# Patient Record
Sex: FEMALE | Race: WHITE | NOT HISPANIC OR LATINO | URBAN - METROPOLITAN AREA
[De-identification: names, ages, dates, MRNs, and addresses within clinical notes are randomized per-mention and may not be internally consistent; named-entity substitution may affect disease eponyms.]

---

## 2019-11-11 ENCOUNTER — OFFICE VISIT (OUTPATIENT)
Dept: MEDICAL GROUP | Facility: IMAGING CENTER | Age: 24
End: 2019-11-11
Payer: COMMERCIAL

## 2019-11-11 VITALS
DIASTOLIC BLOOD PRESSURE: 70 MMHG | WEIGHT: 147 LBS | HEIGHT: 62 IN | RESPIRATION RATE: 12 BRPM | SYSTOLIC BLOOD PRESSURE: 106 MMHG | TEMPERATURE: 99 F | OXYGEN SATURATION: 99 % | HEART RATE: 77 BPM | BODY MASS INDEX: 27.05 KG/M2

## 2019-11-11 DIAGNOSIS — M25.562 PAIN AND SWELLING OF LEFT KNEE: Primary | ICD-10-CM

## 2019-11-11 DIAGNOSIS — Z76.89 ENCOUNTER TO ESTABLISH CARE WITH NEW DOCTOR: ICD-10-CM

## 2019-11-11 DIAGNOSIS — M25.462 PAIN AND SWELLING OF LEFT KNEE: Primary | ICD-10-CM

## 2019-11-11 PROCEDURE — 99203 OFFICE O/P NEW LOW 30 MIN: CPT | Performed by: NURSE PRACTITIONER

## 2019-11-11 RX ORDER — NORGESTIMATE AND ETHINYL ESTRADIOL 0.25-0.035
KIT ORAL
COMMUNITY
Start: 2019-11-03 | End: 2020-10-12 | Stop reason: SDUPTHER

## 2019-11-11 SDOH — HEALTH STABILITY: MENTAL HEALTH
STRESS IS WHEN SOMEONE FEELS TENSE, NERVOUS, ANXIOUS, OR CAN'T SLEEP AT NIGHT BECAUSE THEIR MIND IS TROUBLED. HOW STRESSED ARE YOU?: TO SOME EXTENT

## 2019-11-11 SDOH — HEALTH STABILITY: PHYSICAL HEALTH: ON AVERAGE, HOW MANY DAYS PER WEEK DO YOU ENGAGE IN MODERATE TO STRENUOUS EXERCISE (LIKE A BRISK WALK)?: 0 DAYS

## 2019-11-11 SDOH — HEALTH STABILITY: PHYSICAL HEALTH: ON AVERAGE, HOW MANY MINUTES DO YOU ENGAGE IN EXERCISE AT THIS LEVEL?: 0 MIN

## 2019-11-11 ASSESSMENT — PATIENT HEALTH QUESTIONNAIRE - PHQ9: CLINICAL INTERPRETATION OF PHQ2 SCORE: 0

## 2019-11-11 NOTE — LETTER
[facility]  Vannessa Ohvasquez, A.P.R.N.  [address]   [place] [id number]  Fax: [phone]   Authorization for Release/Disclosure of   Protected Health Information   Name: DAIJA HERRERA : 1995 SSN: [id number]   Address: [address] [place] [address] [address]  [place] [address] Phone:    [phone] (home)    I authorize the entity listed below to release/disclose the PHI below to:   [facility]/Vannessa Butler, A.P.R.N. and Vannessa Ohvasquez, A.P.R.N.   Provider or Entity Name:     Address   City, State, Zip   Phone:      Fax:     Reason for request: continuity of care   Information to be released:    [  ] LAST COLONOSCOPY,  including any PATH REPORT and follow-up  [  ] LAST FIT/COLOGUARD RESULT [  ] LAST DEXA  [  ] LAST MAMMOGRAM  [  ] LAST PAP  [  ] LAST LABS [  ] RETINA EXAM REPORT  [  ] IMMUNIZATION RECORDS  [  ] Release all info      [  ] Check here and initial the line next to each item to release ALL health information INCLUDING  _____ Care and treatment for drug and / or alcohol abuse  _____ HIV testing, infection status, or AIDS  _____ Genetic Testing    DATES OF SERVICE OR TIME PERIOD TO BE DISCLOSED: _____________  I understand and acknowledge that:  * This Authorization may be revoked at any time by you in writing, except if your health information has already been used or disclosed.  * Your health information that will be used or disclosed as a result of you signing this authorization could be re-disclosed by the recipient. If this occurs, your re-disclosed health information may no longer be protected by State or Federal laws.  * You may refuse to sign this Authorization. Your refusal will not affect your ability to obtain treatment.  * This Authorization becomes effective upon signing and will  on (date) __________.      If no date is indicated, this Authorization will  one (1) year from the signature date.    Name: Daija Herrera    Signature:   Date:     2019            PLEASE FAX REQUESTED RECORDS BACK TO: [phone]

## 2019-11-12 PROBLEM — M25.562 PAIN AND SWELLING OF LEFT KNEE: Status: ACTIVE | Noted: 2019-11-12

## 2019-11-12 PROBLEM — M25.462 PAIN AND SWELLING OF LEFT KNEE: Status: ACTIVE | Noted: 2019-11-12

## 2019-11-12 NOTE — ASSESSMENT & PLAN NOTE
Reports that in June 2018 she had left knee swelling with acute pain that lasted for about a month. Denies trauma or injury to knee. States that she was evaluated in [place] by rheumatology. Reports she was told it was not juvenile RA. At that time she was given an injection, unable to recall what the injection was.  States that she recently in August til early November this year had the same swelling and pain in her left knee. States the pain is localized to the medial upper portion of her knee. Denies fever or recent illness. It has since resolved. States when she is experiencing the pain and swelling it is difficult to bend her knee fully, place full pressure on knee when walking, and walk up and down stairs. States she uses a compression wrap with minimal improvement of pain. States she also took Ibuprofen intermittently. 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

## 2019-11-12 NOTE — PROGRESS NOTES
Subjective:     CC: Establish Care      HISTORY OF THE PRESENT ILLNESS: Patient is a 23 y.o. female. Her prior PCP was [facility] medical clinic as a student, last seen 8/2019.  Patient has history of intermittent left knee swelling with pain. Patient is here today to establish care with no acute concerns. 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 Reports taking Femynor 0.25-.35mg-mcg per tablet as her form of birthcontrol. Denies any side effects from medication. States her menstrual cycle is every 28 days and is light lasting 2-4 days.    Pain and swelling of left knee  Reports that in June 2018 she had left knee swelling with acute pain that lasted for about a month. Denies trauma or injury to knee. States that she was evaluated in [place] by rheumatology. Reports she was told it was not juvenile RA. At that time she was given an injection, unable to recall what the injection was.  States that she recently in August til early November this year had the same swelling and pain in her left knee. States the pain is localized to the medial upper portion of her knee. Denies fever or recent illness. It has since resolved. States when she is experiencing the pain and swelling it is difficult to bend her knee fully, place full pressure on knee when walking, and walk up and down stairs. States she uses a compression wrap with minimal improvement of pain. States she also took Ibuprofen intermittently. 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    Allergies: Patient has no known allergies.    Current Outpatient Medications Ordered in Epic   Medication Sig Dispense Refill   • FEMYNOR 0.25-35 MG-MCG per tablet        No current Epic-ordered facility-administered medications on file.      History reviewed. No pertinent past medical history.    History reviewed. No pertinent surgical history.    Social History     Tobacco Use   •  "Smoking status: Never Smoker   • Smokeless tobacco: Never Used   Substance Use Topics   • Alcohol use: Not Currently   • Drug use: Never     Social History     Patient does not qualify to have social determinant information on file (likely too young).   Social History Narrative   • Not on file     Family History   Problem Relation Age of Onset   • Arthritis Mother    • Cancer Mother    • No Known Problems Father    • Anemia Sister    • No Known Problems Brother    • Alzheimer's Disease Maternal Grandmother    • Cancer Paternal Grandmother      Health Maintenance: Completed. Discussed receiving her pap smear screening since turning 22-years-old.    ROS:   Constitutional: Denies fever, chills, night sweats, weight loss/gain or malaise/fatigue.   HENT: Denies headache, nasal congestion, sore throat, hearing loss, enlarged thyroid, or difficulty swallowing.    Eyes: Denies changes in vision, pain.   Respiratory: Denies cough, SOB at rest or activity.    Cardiovascular: Denies tachycardia, chest pain, palpitations, or  leg swelling.   Gastrointestinal: Denies N/V/C/D, abdominal pain, loss appetite, reflux, or hematochezia.  Genitourinary: Denies difficulty voiding, dysuria, nocturia, or hematuria.   Skin: Negative for rash or worrisome moles.   Neurological: Negative for dizziness, focal weakness and headaches.   Endo/Heme/Allergies: Denies bruise/bleed easily, allergies.   Psychiatric/Behavioral: Denies depression, nervous/anxious, difficulty sleeping.  MSK: Hx of left knee pain.  Objective:   Exam: /70 (BP Location: Left arm, Patient Position: Sitting, BP Cuff Size: Adult)   Pulse 77   Temp 37.2 °C (99 °F) (Temporal)   Resp 12   Ht 1.575 m (5' 2\")   Wt 66.7 kg (147 lb)   SpO2 99%  Body mass index is 26.89 kg/m².    General: Normal appearing. No distress.  HEENT: Normocephalic. Eyes conjunctiva clear lids without ptosis, PERRLA, ears normal shape and contour, canals are clear bilaterally, tympanic membranes " pearly gray, intact, non-bulging, no drainage noted, no turbinate erythema, no polyps visible, oropharynx is without erythema, edema or exudates. Teeth intact.  Neck: Supple without JVD or abnormal masses. Small soft mobile thyroid palpated, no nodules palpated, non-tender.  Pulmonary: Clear to ausculation.  Normal effort. No rales, ronchi, or wheezing.  Cardiovascular: Regular rate and rhythm without murmur. Pedal and radial pulses are intact and equal bilaterally.  Abdomen: Soft, nontender, nondistended. Normal bowel sounds. Liver and spleen are not palpable  Lymph: No cervical or supraclavicular lymph nodes are palpable  Skin: Warm and dry.  No obvious lesions.  Musculoskeletal: Normal gait. No extremity cyanosis, clubbing, or edema. Full AROM.  Unable to squat without pain in left knee. No crepitus noted in knees bilaterally. Mild tenderness felt in left medial upper portion of knee with light palpation. Mild non-pitting swelling in left knee when comparing to right knee. No signs of infection.  Psych: Normal mood and affect. Alert and oriented x3. Judgment and insight is normal.    Assessment & Plan:   1. Encounter to establish care with new doctor  Reviewed with patient medication use and side effects. Medical, past, surgical history reviewed with patient. Discussed CDC recommendations for immunizations and USPSTF guidelines for screening exams.  Patient encouraged to schedule pap smear per meeting guidelines for screening, verbalized understanding. Discussed at that time screening for STI per sexually active and never receiving screening for STI. Encouraged patient to wash hands regularly and avoid sick contacts while supporting immune system with Vitamin C, Zinc, Elderberry, and garlic. Discussed continuing Femynor 0.25-0.35mg-mcg pe tablet daily to prevent pregnancy.     2. ChronicPain and swelling of left knee  Medical release form signed to receive records from [facility] and [facility] who  treated patient in the past for recurrent knee pain. Encouraged to continue to wrap knee with compression wrap, use ice for 20 minutes up to 4 times a day, and take Ibuprofen 600 mg up to TID as needed for knee pain for up to three days with food. Discussed long term use of NSAIDS causing GI upset and bleeding verbalized understanding. Instructed to RTC if knee pain and swelling returns for further evaluation. Discussed possibly at that time referral to PT, declined at this time.    Return in about 4 weeks (around 12/9/2019) for PAP.    Please note that this dictation was created using voice recognition software. I have made every reasonable attempt to correct obvious errors, but I expect that there are errors of grammar and possibly content that I did not discover before finalizing the note.

## 2020-01-03 ENCOUNTER — APPOINTMENT (OUTPATIENT)
Dept: MEDICAL GROUP | Facility: IMAGING CENTER | Age: 25
End: 2020-01-03
Payer: COMMERCIAL

## 2020-01-17 ENCOUNTER — HOSPITAL ENCOUNTER (OUTPATIENT)
Dept: RADIOLOGY | Facility: MEDICAL CENTER | Age: 25
End: 2020-01-17
Attending: NURSE PRACTITIONER
Payer: COMMERCIAL

## 2020-01-17 ENCOUNTER — OFFICE VISIT (OUTPATIENT)
Dept: MEDICAL GROUP | Facility: IMAGING CENTER | Age: 25
End: 2020-01-17
Payer: COMMERCIAL

## 2020-01-17 VITALS
OXYGEN SATURATION: 97 % | BODY MASS INDEX: 26.5 KG/M2 | HEART RATE: 115 BPM | DIASTOLIC BLOOD PRESSURE: 82 MMHG | HEIGHT: 62 IN | TEMPERATURE: 103.9 F | SYSTOLIC BLOOD PRESSURE: 116 MMHG | WEIGHT: 144 LBS | RESPIRATION RATE: 16 BRPM

## 2020-01-17 DIAGNOSIS — R05.9 COUGH: ICD-10-CM

## 2020-01-17 DIAGNOSIS — R06.89 DECREASED BREATH SOUNDS: ICD-10-CM

## 2020-01-17 DIAGNOSIS — J02.9 SORE THROAT: ICD-10-CM

## 2020-01-17 DIAGNOSIS — J06.9 ACUTE URI: Primary | ICD-10-CM

## 2020-01-17 DIAGNOSIS — R06.02 SOB (SHORTNESS OF BREATH): ICD-10-CM

## 2020-01-17 PROCEDURE — 99213 OFFICE O/P EST LOW 20 MIN: CPT | Performed by: NURSE PRACTITIONER

## 2020-01-17 PROCEDURE — 71046 X-RAY EXAM CHEST 2 VIEWS: CPT

## 2020-01-17 RX ORDER — AZITHROMYCIN 250 MG/1
TABLET, FILM COATED ORAL
Qty: 6 TAB | Refills: 0 | Status: SHIPPED | OUTPATIENT
Start: 2020-01-17 | End: 2020-02-13

## 2020-01-17 RX ORDER — IPRATROPIUM BROMIDE AND ALBUTEROL SULFATE 2.5; .5 MG/3ML; MG/3ML
3 SOLUTION RESPIRATORY (INHALATION) ONCE
Status: COMPLETED | OUTPATIENT
Start: 2020-01-17 | End: 2020-01-17

## 2020-01-17 RX ORDER — BENZONATATE 100 MG/1
100 CAPSULE ORAL 3 TIMES DAILY PRN
Qty: 60 CAP | Refills: 0 | Status: SHIPPED | OUTPATIENT
Start: 2020-01-17

## 2020-01-17 RX ORDER — ALBUTEROL SULFATE 90 UG/1
2 AEROSOL, METERED RESPIRATORY (INHALATION) EVERY 4 HOURS PRN
Qty: 1 INHALER | Refills: 1 | Status: SHIPPED | OUTPATIENT
Start: 2020-01-17 | End: 2020-02-13

## 2020-01-17 RX ADMIN — IPRATROPIUM BROMIDE AND ALBUTEROL SULFATE 3 ML: 2.5; .5 SOLUTION RESPIRATORY (INHALATION) at 16:37

## 2020-01-17 ASSESSMENT — PAIN SCALES - GENERAL: PAINLEVEL: NO PAIN

## 2020-01-17 NOTE — PROGRESS NOTES
Subjective:     CC: No chief complaint on file.    HPI:   Nathan Don is a 24 y.o. female who presents for annual exam. She is feeling well and denies any complaints.    Ob-Gyn/ History:    Patient has GYN provider: {YES/NO:63}  /Para:  ***  Last Pap Smear:  ***. *** history of abnormal pap smears.  Gyn Surgery:  ***.  Current Contraceptive Method:  ***. *** currently sexually active.  Last menstrual period:  ***.  Periods regular. {Description; bleeding vaginal:00704} bleeding. Cramping is {MILD/MOD/SEVERE/VARIABLE:34494}.   She {DOES/DOES NOT:76365} take OTC analgesics for cramps.  No significant bloating/fluid retention, pelvic pain, or dyspareunia. No vaginal discharge  Post-menopausal bleeding: ***  Urinary incontinence: ***  Folate intake: *** {F childbearing age folate 0.4-0.8mg daily. USPSTF: A}    Health Maintenance  Advanced directive: *** {age >=65}  Osteoporosis Screen/ DEXA: *** {once 65 + or risk factors, frax tool}  PT/vit D for falls prevention: *** {65+ if higher risk for falls}  Cholesterol Screening: *** {45+ yo, q5yr}  Diabetes Screening: *** {40-71 yo who are overweight or obese}  Aspirin Use: ***  {50-59 for the primary prevention of cardiovascular disease (CVD) and colorectal cancer (CRC) if: 10% or greater 10-year CVD risk, not at increased risk for bleeding, life expectancy >10 years, and willing to take low-dose aspirin daily for 10 years. USPSTF: B}  Diet: *** {BMI ?25 diet & physical activity counseling, BMI ?30 offer or referral for intervention. USPSTF: B}  Exercise: *** {BMI ?25 diet & physical activity counseling, BMI ?30 offer or referral for intervention. USPSTF: B}  Substance Abuse: *** {counseling if alcohol misuse. USPSTF: B}  Safe in relationship. {F childbearing age intimate partner violence screening. USPSTF: B}  Seat belts, bike helmet, gun safety discussed.  Sun protection used.    Cancer screening  Cervical Cancer Screening:  Breast Cancer Screening:      Infectious disease screening/Immunizations  --STI Screening: *** {GC/CT F16-24 yearly, USPSTF: B/A}  --Practices safe sex.  --HIV Screening: *** {15-65 once. USPSTF: A}  --Hepatitis C Screening: *** {born [birth year]32 once. USPSTF: B}  --Immunizations:    Influenza: 11/12/19   HPV: 1st dose:  04/10/2009   Tetanus: 09/2015    Other immunizations:     She  has no past medical history on file.  She  has no past surgical history on file.    Family History   Problem Relation Age of Onset   • Arthritis Mother    • Cancer Mother    • No Known Problems Father    • Anemia Sister    • No Known Problems Brother    • Alzheimer's Disease Maternal Grandmother    • Cancer Paternal Grandmother        Social History     Socioeconomic History   • Marital status: Single     Spouse name: Not on file   • Number of children: Not on file   • Years of education: Not on file   • Highest education level: Not on file   Occupational History   • Not on file   Social Needs   • Financial resource strain: Not on file   • Food insecurity:     Worry: Not on file     Inability: Not on file   • Transportation needs:     Medical: Not on file     Non-medical: Not on file   Tobacco Use   • Smoking status: Never Smoker   • Smokeless tobacco: Never Used   Substance and Sexual Activity   • Alcohol use: Not Currently   • Drug use: Never   • Sexual activity: Yes     Partners: Male     Birth control/protection: Pill   Lifestyle   • Physical activity:     Days per week: 0 days     Minutes per session: 0 min   • Stress: To some extent   Relationships   • Social connections:     Talks on phone: Not on file     Gets together: Not on file     Attends Jewish service: Not on file     Active member of club or organization: Not on file     Attends meetings of clubs or organizations: Not on file     Relationship status: Not on file   • Intimate partner violence:     Fear of current or ex partner: Not on file     Emotionally abused: Not on file     Physically abused:  Not on file     Forced sexual activity: Not on file   Other Topics Concern   • Not on file   Social History Narrative   • Not on file       Patient Active Problem List    Diagnosis Date Noted   • Pain and swelling of left knee 11/12/2019       Current Outpatient Medications   Medication Sig Dispense Refill   • FEMYNOR 0.25-35 MG-MCG per tablet        No current facility-administered medications for this visit.      No Known Allergies    Review of Systems   Constitutional: Denies fever, chills, night sweats, weight loss/gain or malaise/fatigue.   HENT: Denies headache, nasal congestion, sore throat, hearing loss, enlarged thyroid, or difficulty swallowing.    Eyes: Denies changes in vision, pain.   Respiratory: Denies cough, SOB at rest or activity.    Cardiovascular: Denies tachycardia, chest pain, palpitations, or  leg swelling.   Gastrointestinal: Denies N/V/C/D, abdominal pain, loss appetite, reflux, or hematochezia.  Genitourinary: Denies difficulty voiding, dysuria, nocturia, or hematuria.   Skin: Negative for rash or worrisome moles.   Neurological: Negative for dizziness, focal weakness and headaches.   Endo/Heme/Allergies: Denies bruise/bleed easily, allergies.   Psychiatric/Behavioral: Denies depression, nervous/anxious, difficulty sleeping.  MSK: Hx of left knee pain.  Objective:     There were no vitals taken for this visit.  There is no height or weight on file to calculate BMI.  Wt Readings from Last 4 Encounters:   11/11/19 66.7 kg (147 lb)       Physical Exam:  General: Normal appearing. No distress.  HEENT: Normocephalic. Eyes conjunctiva clear lids without ptosis, PERRLA, ears normal shape and contour, canals are clear bilaterally, tympanic membranes pearly gray, intact, non-bulging, no drainage noted, Nares patent, no turbinate erythema, no polyps visible. Teeth intact. Oropharynx is without erythema, edema or exudates. Mallampati score***.   Neck: Supple without JVD or abnormal masses. Small soft  mobile thyroid palpated, no nodules palpated, non-tender.  Pulmonary: Clear to ausculation.  Normal effort. No rales, ronchi, or wheezing.  Cardiovascular: Regular rate and rhythm without murmur. Pedal and radial pulses are intact and equal bilaterally.  Abdomen: Soft, nontender, nondistended. Normal bowel sounds. Liver and spleen are not palpable.  Lymph: No cervical or supraclavicular lymph nodes are palpable  Skin: Warm and dry.  No obvious lesions. No rash noted.  Musculoskeletal: Normal gait. No extremity cyanosis, clubbing, or edema. Full AROM of neck and extremities.   Psych: Normal mood and affect. Judgment and insight is normal.  Breast: Breasts examined supine. No skin changes, peau d'orange or nipple retraction. No discharge. No axillary or supraclavicular adenopathy. No masses or nodularity palpable. ***  :Perineum and external genitalia normal without rash. Vagina with {GYN VAGINAL DISCHARGE:721} discharge. Cervix without visible lesions or discharge. Bimanual exam without adnexal masses or cervical motion tenderness.***  Neurological: Alert and oriented x 3. DTRs 2+/3 and symmetric. CN 2-12 grossly intact, Sensation intact.     A chaperone was offered to the patient during today's exam. Chaperone name: Katie Guillory was present.    Assessment and Plan:         Follow-up: No follow-ups on file.

## 2020-01-18 NOTE — PROGRESS NOTES
Subjective:   CC: Gynecologic Exam and Other (monday night. cough. runny nose. headache. body aches. acetimophen OTC. )    HPI:   Nathan presents today for annual exam with pap smear. Due to current acute illness, patient will reschedule annual and pap smear.    States that she started feeling ill on Monday January 13th, 2020. States illness started with fatigue, body aches, chills, sore throat, and nasal congestion. States sore throat became worse, and dull headache and cough developed on Wednesday. States that her headache is gone today. Denies fever, although, states she does not have a thermometer at home. Denies cough being productive. States current nasal secretions are clear to cloudy. States after coughing she feels SOB. 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 States that she has multiple sick contacts due to working in a school. States that a fellow teacher has told her that 3 children in her class tested positive for influenza. Denies any OTC remedies at this time.    History reviewed. No pertinent past medical history.    Social History     Tobacco Use   • Smoking status: Never Smoker   • Smokeless tobacco: Never Used   Substance Use Topics   • Alcohol use: Not Currently   • Drug use: Never     Current Outpatient Medications Ordered in Epic   Medication Sig Dispense Refill   • benzonatate (TESSALON) 100 MG Cap Take 1 Cap by mouth 3 times a day as needed for Cough. 60 Cap 0   • azithromycin (ZITHROMAX) 250 MG Tab 500 mg in a single dose on day 1 then 250 mg per day on days 2 through 5. 6 Tab 0   • albuterol 108 (90 Base) MCG/ACT Aero Soln inhalation aerosol Inhale 2 Puffs by mouth every four hours as needed for Shortness of Breath. 1 Inhaler 1   • FEMYNOR 0.25-35 MG-MCG per tablet        No current Epic-ordered facility-administered medications on file.      Allergies:  Patient has no known allergies.    ROS:  Constitutional: Denies fever, night sweats, weight loss/gain.  "Chills, malaise, and fatigue.   HENT: Denies hearing loss, enlarged thyroid, or difficulty swallowing.  Nasal congestion and sore throat.  Eyes: Denies changes in vision, pain.   Respiratory: Cough, SOB after coughing fit. Denies SOB at rest.    Cardiovascular: Denies tachycardia, chest pain, palpitations, or  leg swelling.   Gastrointestinal: Denies N/V/C/D, abdominal pain, loss appetite, reflux, or hematochezia.  Genitourinary: Denies difficulty voiding, dysuria, nocturia, or hematuria.   Skin: Negative for rash or worrisome moles.   Neurological: Negative for dizziness, focal weakness and headaches.   Endo/Heme/Allergies: Denies bruise/bleed easily, allergies.   Psychiatric/Behavioral: Denies depression, nervous/anxious, difficulty sleeping.    Objective:   Exam:  /82 (BP Location: Left arm, Patient Position: Sitting, BP Cuff Size: Adult)   Pulse (!) 115   Temp (!) 39.9 °C (103.9 °F) (Temporal)   Resp 16   Ht 1.575 m (5' 2\")   Wt 65.3 kg (144 lb)   LMP 01/02/2020   SpO2 97%   BMI 26.34 kg/m²  Body mass index is 26.34 kg/m².  General: Ill appearing. No distress.  HEENT: Normocephalic. Eyes conjunctiva clear lids without ptosis, eyes are watery, PERRLA, ears normal shape and contour, canals are clear bilaterally, tympanic membranes pearly gray, intact, mildly bulging, no drainage noted, no turbinate erythema, no polyps visible, oropharynx is with moderate erythema, no edema or exudates. Mallampati score +2. Teeth intact.  Neck: Supple without JVD or abnormal masses. Small soft mobile thyroid palpated, no nodules palpated, non-tender.  Pulmonary: Pretreatment: Diminished breath sound throughout. Normal effort. No rales, ronchi, or wheezing.  Posttreatment: Improved breath sounds throughout, clear to ausculation. Normal effort. No rales, ronchi, or wheezing.   Cardiovascular: Regular rate and rhythm without murmur. Pedal and radial pulses are intact and equal bilaterally.  Abdomen: Soft, nontender, " nondistended. Normal bowel sounds. Liver and spleen are not palpable  Lymph: No cervical or supraclavicular lymph nodes are palpable. Small, soft, mobile non-tender, enlarged submandibular lymph nodes palpated.  Skin: Warm and dry.  No obvious lesions.  Musculoskeletal: Normal gait. No extremity cyanosis, clubbing, or edema.  Psych: Normal mood and affect. Alert and oriented x3. Judgment and insight is normal.    Assessment & Plan:   1. Acute URI  2. Cough  3. SOB (shortness of breath)  4. Decreased breath sounds  This is a stable condition. Discussed with patient physical assessment finding. Discussed in office breathing treatment due to diminished breath sounds, verbalized understanding. Discussed continue albuertol inhaler at home. Verbalized that she has used on at home with ill in the past with similar symptoms. Instructed to use every 4-6 hours for the next 48 hours for SOB and/or cough, and then to slowly decrease use if possible. Discussed obtain chest xray due to diminished breath sounds on initial exam, cough, and fever, verbalized understanding. Discussed with patient illness is bacterial in nature due to length of illness and current temperature, and antibiotics are indicated at this time. Discussed negative POCT strep and Influenza A/B test, verbalized understanding. Discussed taking Zpack in its entirety, verbalized understanding. Discussed not returning to work until her fever is decrease and she is on antibiotics for 24 hours due to be contagious, verbalized understanding. Decliined work note at this time. Instructed to take 500-1000 mg of Tylenol when she gets home, and to continue every 6 hours as tolerated for fever and discomfort, not to exceed more than 4,000 mg in 24 hours. Discussed taking Tessalon pearls for on-going cough. Instructed how to take medication, verbalized understanding.  Encouraged to increase or maintain hydration. RTC if symptoms worsen or continues to have fever 102-104  degrees. Instructed to seek emergency services or urgent care if symptoms continue over the weekend, or if she is unable to maintain hydration, verbalized understanding. Encouraged patient to wash hands regularly and avoid sick contacts while supporting immune system with Vitamin C, Zinc, Elderberry, and garlic. Encouraged to obtain thermometer at pharmacy when picking up medication, verbalized understanding.     -     DX-CHEST-2 VIEWS; Future  -     benzonatate (TESSALON) 100 MG Cap; Take 1 Cap by mouth 3 times a day as needed for Cough.  -     azithromycin (ZITHROMAX) 250 MG Tab; 500 mg in a single dose on day 1 then 250 mg per day on days 2 through 5.  -     ipratropium-albuterol (DUONEB) nebulizer solution  -     POCT Influenza A/B  -     albuterol 108 (90 Base) MCG/ACT Aero Soln inhalation aerosol; Inhale 2 Puffs by mouth every four hours as needed for Shortness of Breath.    5. Sore throat  This is a stable condition. See plan of care above.   -     POCT Rapid Strep A      Return in about 1 week (around 1/24/2020), or follow-up and annual exam.    Please note that this dictation was created using voice recognition software. I have made every reasonable attempt to correct obvious errors, but I expect that there are errors of grammar and possibly content that I did not discover before finalizing the note.

## 2020-02-13 ENCOUNTER — OFFICE VISIT (OUTPATIENT)
Dept: MEDICAL GROUP | Facility: IMAGING CENTER | Age: 25
End: 2020-02-13
Payer: COMMERCIAL

## 2020-02-13 VITALS
OXYGEN SATURATION: 97 % | HEIGHT: 62 IN | BODY MASS INDEX: 28.34 KG/M2 | DIASTOLIC BLOOD PRESSURE: 68 MMHG | TEMPERATURE: 98.9 F | SYSTOLIC BLOOD PRESSURE: 116 MMHG | HEART RATE: 73 BPM | WEIGHT: 154 LBS | RESPIRATION RATE: 13 BRPM

## 2020-02-13 DIAGNOSIS — Z11.3 ROUTINE SCREENING FOR STI (SEXUALLY TRANSMITTED INFECTION): ICD-10-CM

## 2020-02-13 DIAGNOSIS — Z01.419 WELL WOMAN EXAM: Primary | ICD-10-CM

## 2020-02-13 DIAGNOSIS — Z12.4 ENCOUNTER FOR PAPANICOLAOU SMEAR OF CERVIX: ICD-10-CM

## 2020-02-13 DIAGNOSIS — R05.9 COUGH: ICD-10-CM

## 2020-02-13 DIAGNOSIS — J34.89 RHINORRHEA: ICD-10-CM

## 2020-02-13 PROCEDURE — 99395 PREV VISIT EST AGE 18-39: CPT | Performed by: NURSE PRACTITIONER

## 2020-02-13 SDOH — SOCIAL STABILITY: SOCIAL INSECURITY: WITHIN THE LAST YEAR, HAVE YOU BEEN HUMILIATED OR EMOTIONALLY ABUSED IN OTHER WAYS BY YOUR PARTNER OR EX-PARTNER?: NO

## 2020-02-13 SDOH — HEALTH STABILITY: MENTAL HEALTH: HOW MANY STANDARD DRINKS CONTAINING ALCOHOL DO YOU HAVE ON A TYPICAL DAY?: 1 OR 2

## 2020-02-13 SDOH — HEALTH STABILITY: MENTAL HEALTH: HOW OFTEN DO YOU HAVE A DRINK CONTAINING ALCOHOL?: MONTHLY OR LESS

## 2020-02-13 SDOH — SOCIAL STABILITY: SOCIAL INSECURITY
WITHIN THE LAST YEAR, HAVE TO BEEN RAPED OR FORCED TO HAVE ANY KIND OF SEXUAL ACTIVITY BY YOUR PARTNER OR EX-PARTNER?: NO

## 2020-02-13 SDOH — SOCIAL STABILITY: SOCIAL INSECURITY: WITHIN THE LAST YEAR, HAVE YOU BEEN AFRAID OF YOUR PARTNER OR EX-PARTNER?: NO

## 2020-02-13 SDOH — SOCIAL STABILITY: SOCIAL INSECURITY
WITHIN THE LAST YEAR, HAVE YOU BEEN KICKED, HIT, SLAPPED, OR OTHERWISE PHYSICALLY HURT BY YOUR PARTNER OR EX-PARTNER?: NO

## 2020-02-13 SDOH — SOCIAL STABILITY: SOCIAL NETWORK: ARE YOU MARRIED, WIDOWED, DIVORCED, SEPARATED, NEVER MARRIED, OR LIVING WITH A PARTNER?: NEVER MARRIED

## 2020-02-13 ASSESSMENT — PATIENT HEALTH QUESTIONNAIRE - PHQ9: CLINICAL INTERPRETATION OF PHQ2 SCORE: 0

## 2020-02-13 ASSESSMENT — PAIN SCALES - GENERAL: PAINLEVEL: NO PAIN

## 2020-02-13 NOTE — PROGRESS NOTES
Subjective:     CC:   Chief Complaint   Patient presents with   • Gynecologic Exam     HPI:   Nathan Don is a 24 y.o. female who presents for annual exam.     States she is feeling better since last visit were she was given a prescription for a Zpak for cough, body aches, and cough for over 7 days. States she started feeling better in 24 hours. States currently has cold-like symptoms. States since 2020 she has had a a runny nose with clear mucous and mild cough. States at onset of symptoms she had a sore throat that since has resolved. Denies fever, chills, body aches, N/V/D/C, or a rash. Denies any OTC remedies or medication at this time. States she has been using previously provided Tessalon pearls at night for her cough that is worse at night. States that overall she is improving and feeling better.     Ob-Gyn/ History:    Patient has GYN provider: No  /Para:  0/0  Last Pap Smear: First pap smear today.  Gyn Surgery: None.  Current Contraceptive Method:  Birth control pill. Yes, currently sexually active. Currently not wear condoms with partner.  Last menstrual period: 2020.  Periods regular. Moderate bleeding. Cramping is variable.   She does take OTC analgesics for cramps.  No significant bloating/fluid retention, pelvic pain, or dyspareunia. No vaginal discharge  Urinary incontinence: Yes, intermittently with exercising.  Folate intake: Discussed.  Recommended taking prenatal vitamin or folic acid 400 mcg daily.  Patient verbalized understanding.    Health Maintenance  Advanced directive: No.   Cholesterol Screening: No, not at this time.  Patient declines at this time.  Verbalized understanding that higher BMI places her at risk for higher cholesterol.  Diabetes Screening: No, not at this time.  Patient declines at this time.  Verbalized understanding that higher BMI places her at risk for prediabetes.  Diet: States that she eats a mixed diet of fruits, vegetables, meats, grains.   States that she is not concerned about her diet at this time.  Exercise: States she has no regular exercise regimen.  Substance Abuse: No.   Safe in relationship.  Seat belts, bike helmet, gun safety discussed. No guns in home.  Sun protection used.    Cancer screening  Cervical Cancer Screening: Completed today.  Breast Cancer Screening: Clinical breast exam done today. Discussed self breast examine at home.  Discussed with patient that at this time it is not recommended to do self breast exams at home, but shown how to do a breast exam if she chooses to at home.  This based on patient-provider informed decision making.  Infectious disease screening/Immunizations  --STI Screening: Chlamydia and gonorrhea screening done today.  --Practices safe sex.  Reviewed with patient the benefit of wearing condoms with current partner, and future new sexual partners prevention of STIs transmission.  --HIV Screening: Declines at this time.  Willing for future screening if blood is drawn.  --Immunizations:    Influenza: Declined.    HPV: Declined at this time. States that she will return to clinic at a future date for vaccine.   Tetanus: 9/9/2025   Other immunizations: Declined Trumenba at this time.  States that she will return to clinic at a future time              for vaccine.    She  has no past medical history on file.  She  has no past surgical history on file.    Family History   Problem Relation Age of Onset   • Arthritis Mother    • Cancer Mother    • No Known Problems Father    • Anemia Sister    • No Known Problems Brother    • Alzheimer's Disease Maternal Grandmother    • Cancer Paternal Grandmother      Social History     Socioeconomic History   • Marital status: Single     Spouse name: Not on file   • Number of children: Not on file   • Years of education: Not on file   • Highest education level: Not on file   Occupational History   • Not on file   Social Needs   • Financial resource strain: Not on file   • Food  insecurity     Worry: Not on file     Inability: Not on file   • Transportation needs     Medical: Not on file     Non-medical: Not on file   Tobacco Use   • Smoking status: Never Smoker   • Smokeless tobacco: Never Used   Substance and Sexual Activity   • Alcohol use: Yes     Alcohol/week: 0.6 oz     Types: 1 Glasses of wine per week     Frequency: Monthly or less     Drinks per session: 1 or 2   • Drug use: Never   • Sexual activity: Yes     Partners: Male     Birth control/protection: Pill   Lifestyle   • Physical activity     Days per week: 0 days     Minutes per session: 0 min   • Stress: To some extent   Relationships   • Social connections     Talks on phone: Not on file     Gets together: Not on file     Attends Che[employer] service: Not on file     Active member of club or organization: Not on file     Attends meetings of clubs or organizations: Not on file     Relationship status: Never    • Intimate partner violence     Fear of current or ex partner: No     Emotionally abused: No     Physically abused: No     Forced sexual activity: No   Other Topics Concern   • Not on file   Social History Narrative   • Not on file     Patient Active Problem List    Diagnosis Date Noted   • Pain and swelling of left knee 11/12/2019     Current Outpatient Medications   Medication Sig Dispense Refill   • benzonatate (TESSALON) 100 MG Cap Take 1 Cap by mouth 3 times a day as needed for Cough. 60 Cap 0   • FEMYNOR 0.25-35 MG-MCG per tablet        No current facility-administered medications for this visit.      No Known Allergies    Review of Systems   Constitutional: Denies fever, chills, night sweats, weight loss/gain or malaise/fatigue.   HENT: Denies nasal congestion, sore throat, hearing loss, enlarged thyroid, or difficulty swallowing.  Rhinorrhea, see HPI.  Eyes: Denies changes in vision, pain.  Respiratory: Denies SOB at rest or activity. Cough, see HPI.  Cardiovascular: Denies tachycardia, chest pain,  "palpitations, or  leg swelling.   Gastrointestinal: Denies N/V/C/D, abdominal pain, loss appetite, reflux, or hematochezia.  Genitourinary: Denies difficulty voiding, dysuria, nocturia, or hematuria.   Skin: Negative for rash or worrisome moles.   Neurological: Negative for dizziness, focal weakness and headache.   Endo/Heme/Allergies: Denies bruise/bleed easily, allergies.   Psychiatric/Behavioral: Denies depression, nervous/anxious, difficulty sleeping.    Objective:     /68 (BP Location: Right arm, Patient Position: Sitting, BP Cuff Size: Adult)   Pulse 73   Temp 37.2 °C (98.9 °F) (Temporal)   Resp 13   Ht 1.575 m (5' 2\")   Wt 69.9 kg (154 lb)   LMP 01/31/2020   SpO2 97%   BMI 28.17 kg/m²   Body mass index is 28.17 kg/m².  Wt Readings from Last 4 Encounters:   02/13/20 69.9 kg (154 lb)   01/17/20 65.3 kg (144 lb)   11/11/19 66.7 kg (147 lb)     Physical Exam:  General: Normal appearing. No distress.  HEENT: Normocephalic. Eyes conjunctiva clear lids without ptosis, PERRLA, ears normal shape and contour, canals are clear bilaterally, tympanic membranes pearly gray, intact, mildly bulging, no drainage noted, Nares patent, mild turbinate erythema, no polyps visible. Teeth intact. Oropharynx is with mild erythema, no edema or exudates.   Neck: Supple without JVD or abnormal masses. Small soft mobile thyroid palpated, no nodules palpated, non-tender.  Pulmonary: Clear to ausculation.  Normal effort. No rales, ronchi, or wheezing.  Cardiovascular: Regular rate and rhythm without murmur. Pedal and radial pulses are intact and equal bilaterally.  Abdomen: Soft, mildly tender, nondistended. Normal bowel sounds. Liver and spleen are not palpable.  Lymph: No cervical or supraclavicular lymph nodes are palpable  Skin: Warm and dry.  No obvious lesions. No rash noted.  Musculoskeletal: Normal gait. No extremity cyanosis, clubbing, or edema. Full AROM of neck and extremities.   Psych: Normal mood and affect. " Judgment and insight is normal.  Breast: Breasts examined supine. No skin changes, peau d'orange or nipple retraction. No discharge. No axillary or supraclavicular adenopathy. No masses or nodularity palpable.  :Perineum and external genitalia normal without rash. Vagina with normal and physiologic, scant, white and odorless discharge. Cervix without visible lesions or discharge. Noted non-raised scarring/discoloration of white on external labia majora bilateral. Patient states it is from scarring of ingrown hair in the past.  Neurological: Alert and oriented x 3. DTRs 2+/3 and symmetric. CN 2-12 grossly intact, Sensation intact.     A chaperone was offered to the patient during today's exam. Chaperone name: Katie Guillory MA was present.    Assessment and Plan:   1. Encounter for Papanicolaou smear of cervix  2. Well woman exam  3. Routine screening for STI (sexually transmitted infection)  Reviewed with patient medication use and side effects. Medical, past, surgical history reviewed with patient. Discussed with patient the risk and benefits of receiving vaccines. Discussed CDC recommendations for immunizations and USPSTF guidelines for screening exams.  Verbalized understanding. Encouraged patient to wash hands regularly and avoid sick contacts while supporting immune system with Vitamin C, Zinc, Elderberry, and garlic.  Instructed patient that provider will report Pap smear results to her, and Chlamydia gonorrhea screening results.  Discussed with patient pending lab results, at that time, we will discuss intervals of Pap smear screening.  As stated above patient encouraged to return to clinic for age-appropriate vaccines. Discussed with patient no bimanual examine needed at this time due not  symptoms. Discussed that future well women exams might include bimanual exam, verbalized understanding.    -     THINPREP PAP WITH HPV; Future  -     Chlamydia/GC PCR Urine Or Swab; Future    4. Cough  5.  Rhinorrhea  This is a stable condition. Discussed with patient illness is viral in nature, and antibiotics are not indicated at this time.  Encouraged to increase or maintain hydration, saline irrigation (Neti pot), RTC if symptoms worsen or develops fever.  She instructed to take over-the-counter allergy medication to decrease mucus in nares.  Discussed with patient physical assessment findings of mildly bulging tympanic membrane and postnasal drip.  Discussed with patient continuing to take Tessalon Perles as tolerated for cough as needed. Encouraged patient to wash hands regularly and avoid sick contacts while supporting immune system with Vitamin C, Zinc, Elderberry, and garlic.    Follow-up: Return in about 1 year (around 2/13/2021) for Annual visit.

## 2020-03-08 ENCOUNTER — TELEPHONE (OUTPATIENT)
Dept: MEDICAL GROUP | Facility: IMAGING CENTER | Age: 25
End: 2020-03-08

## 2020-03-09 DIAGNOSIS — Z11.3 ROUTINE SCREENING FOR STI (SEXUALLY TRANSMITTED INFECTION): ICD-10-CM

## 2020-03-09 DIAGNOSIS — Z12.4 ENCOUNTER FOR PAPANICOLAOU SMEAR OF CERVIX: ICD-10-CM

## 2020-10-12 RX ORDER — NORGESTIMATE AND ETHINYL ESTRADIOL 0.25-0.035
1 KIT ORAL DAILY
Qty: 28 TAB | Refills: 11 | Status: SHIPPED | OUTPATIENT
Start: 2020-10-12 | End: 2021-07-14

## 2020-10-12 NOTE — TELEPHONE ENCOUNTER
----- Message from Nathan Don sent at 10/11/2020  9:34 PM PDT -----  Regarding: Prescription Question  Contact: [phone]  I would like to get a refill of my birth control prescription. [facility] isn’t allowing me to request a refill.

## 2021-07-23 ENCOUNTER — APPOINTMENT (OUTPATIENT)
Dept: MEDICAL GROUP | Facility: IMAGING CENTER | Age: 26
End: 2021-07-23
Payer: COMMERCIAL

## 2023-10-15 NOTE — TELEPHONE ENCOUNTER
Patient care taken over from DULCE MARIA, pending troponin and urine studies. 9:27 PM  Patient's troponin is negative. Pregnancy test is positive. Patient is aware that she is in early pregnancy, but has not followed up with OB yet. Suspect this is contributing/causing her syncopal events along with mild dehydration. Advised increasing PO hydration, and following up with OBGYN for ongoing care.       Angus Christianson MD  10/14/23 [id number] Received request via: Patient    Was the patient seen in the last year in this department? Yes    Does the patient have an active prescription (recently filled or refills available) for medication(s) requested? No

## 2025-01-07 NOTE — TELEPHONE ENCOUNTER
Called [facility] to request results be faxed to [phone].  
Perfect, thank you. The patient has been notified of her results via [employer] message.  DESMOND Lockwood    
Please call lab and ask why this patient's pap smear results and STI screening has not been reported. It is documented that it was collected and sent on 2/13/2020. There are no results reported for this collection.  CONG LockwoodC    
yes